# Patient Record
Sex: FEMALE | Race: WHITE | NOT HISPANIC OR LATINO | URBAN - METROPOLITAN AREA
[De-identification: names, ages, dates, MRNs, and addresses within clinical notes are randomized per-mention and may not be internally consistent; named-entity substitution may affect disease eponyms.]

---

## 2023-08-18 ENCOUNTER — TELEPHONE (OUTPATIENT)
Dept: GASTROENTEROLOGY | Facility: CLINIC | Age: 23
End: 2023-08-18
Payer: COMMERCIAL

## 2023-08-18 NOTE — TELEPHONE ENCOUNTER
----- Message from Booker Sanchez sent at 8/18/2023 11:06 AM CDT -----  Name of Who is Calling:TEX TOM [[id number]]        What is the request in detail:Would like a call back to discuss getting a NP appt for IBS, pt only wants to see Np Saale if possible. Please advise       Can the clinic reply by [facility]:NO        What Number to Call Back if not in [facility]:.Telephone Information:  Mobile          [phone]

## 2023-08-25 ENCOUNTER — OFFICE VISIT (OUTPATIENT)
Dept: GASTROENTEROLOGY | Facility: CLINIC | Age: 23
End: 2023-08-25
Payer: COMMERCIAL

## 2023-08-25 VITALS
BODY MASS INDEX: 23.35 KG/M2 | DIASTOLIC BLOOD PRESSURE: 70 MMHG | WEIGHT: 157.63 LBS | HEART RATE: 88 BPM | SYSTOLIC BLOOD PRESSURE: 112 MMHG | HEIGHT: 69 IN

## 2023-08-25 DIAGNOSIS — R11.0 NAUSEA: ICD-10-CM

## 2023-08-25 DIAGNOSIS — K58.1 IRRITABLE BOWEL SYNDROME WITH CONSTIPATION: Primary | ICD-10-CM

## 2023-08-25 PROCEDURE — 1160F PR REVIEW ALL MEDS BY PRESCRIBER/CLIN PHARMACIST DOCUMENTED: ICD-10-PCS | Mod: CPTII,S$GLB,, | Performed by: NURSE PRACTITIONER

## 2023-08-25 PROCEDURE — 1160F RVW MEDS BY RX/DR IN RCRD: CPT | Mod: CPTII,S$GLB,, | Performed by: NURSE PRACTITIONER

## 2023-08-25 PROCEDURE — 3078F PR MOST RECENT DIASTOLIC BLOOD PRESSURE < 80 MM HG: ICD-10-PCS | Mod: CPTII,S$GLB,, | Performed by: NURSE PRACTITIONER

## 2023-08-25 PROCEDURE — 3078F DIAST BP <80 MM HG: CPT | Mod: CPTII,S$GLB,, | Performed by: NURSE PRACTITIONER

## 2023-08-25 PROCEDURE — 3074F SYST BP LT 130 MM HG: CPT | Mod: CPTII,S$GLB,, | Performed by: NURSE PRACTITIONER

## 2023-08-25 PROCEDURE — 3008F BODY MASS INDEX DOCD: CPT | Mod: CPTII,S$GLB,, | Performed by: NURSE PRACTITIONER

## 2023-08-25 PROCEDURE — 99204 OFFICE O/P NEW MOD 45 MIN: CPT | Mod: S$GLB,,, | Performed by: NURSE PRACTITIONER

## 2023-08-25 PROCEDURE — 3074F PR MOST RECENT SYSTOLIC BLOOD PRESSURE < 130 MM HG: ICD-10-PCS | Mod: CPTII,S$GLB,, | Performed by: NURSE PRACTITIONER

## 2023-08-25 PROCEDURE — 99999 PR PBB SHADOW E&M-EST. PATIENT-LVL IV: CPT | Mod: PBBFAC,,, | Performed by: NURSE PRACTITIONER

## 2023-08-25 PROCEDURE — 99204 PR OFFICE/OUTPT VISIT, NEW, LEVL IV, 45-59 MIN: ICD-10-PCS | Mod: S$GLB,,, | Performed by: NURSE PRACTITIONER

## 2023-08-25 PROCEDURE — 3008F PR BODY MASS INDEX (BMI) DOCUMENTED: ICD-10-PCS | Mod: CPTII,S$GLB,, | Performed by: NURSE PRACTITIONER

## 2023-08-25 PROCEDURE — 99999 PR PBB SHADOW E&M-EST. PATIENT-LVL IV: ICD-10-PCS | Mod: PBBFAC,,, | Performed by: NURSE PRACTITIONER

## 2023-08-25 PROCEDURE — 1159F MED LIST DOCD IN RCRD: CPT | Mod: CPTII,S$GLB,, | Performed by: NURSE PRACTITIONER

## 2023-08-25 PROCEDURE — 1159F PR MEDICATION LIST DOCUMENTED IN MEDICAL RECORD: ICD-10-PCS | Mod: CPTII,S$GLB,, | Performed by: NURSE PRACTITIONER

## 2023-08-25 RX ORDER — LUBIPROSTONE 24 UG/1
24 CAPSULE ORAL 2 TIMES DAILY
Qty: 60 CAPSULE | Refills: 11 | Status: SHIPPED | OUTPATIENT
Start: 2023-08-25

## 2023-08-25 NOTE — PROGRESS NOTES
"Clinic Consult:  [facility] Consultation Note    Reason for Consult:  The primary encounter diagnosis was Irritable bowel syndrome with constipation. A diagnosis of Nausea was also pertinent to this visit.    PCP: No, Primary Doctor   No address on file    HPI:  This is a 23 y.o. female here for evaluation of constipation.   Onset: chronic since childhood. Worsened a couple of years ago.   Stool frequency: once a week  Stool consistency: hard  Incomplete evacuation: yes  Associated symptoms: abdominal bloating  and straining. Early satiety. Abdominal pain that is burning and nausea in the AM. 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   Hematochezia or melena present: no  Treatments tried: Linzess 290 mg + Miralax + magnesium-- ineffective or severe diarrhea.      Review of Systems   Constitutional:  Negative for fever and weight loss.   Respiratory:  Negative for cough.    Cardiovascular:  Negative for chest pain and palpitations.   Gastrointestinal:  Positive for abdominal pain, constipation and nausea. Negative for blood in stool, diarrhea, heartburn, melena and vomiting.       Medical History: unremarkable     Surgical History:  has a past surgical history that includes Nasal septoplasty; [place] tooth extraction; and Tonsillectomy and adenoidectomy.    Family History: family history includes Hypertension in her maternal grandmother..     Social History:  reports that she has never smoked. She has never used smokeless tobacco.    Allergies: Reviewed    Home Medications:   No current outpatient medications on file prior to visit.     No current facility-administered medications on file prior to visit.       Physical Exam:  /70 (BP Location: Left arm, Patient Position: Sitting, BP Method: Medium (Manual))   Pulse 88   Ht 5' 9" (1.753 m)   Wt 71.5 kg (157 lb 10.1 oz)   LMP 08/25/2023 (Exact Date)   BMI 23.28 kg/m²   Body mass index is 23.28 kg/m².  Physical Exam  Constitutional:       General: She is " not in acute distress.  HENT:      Head: Normocephalic and atraumatic.   Cardiovascular:      Rate and Rhythm: Normal rate and regular rhythm.      Heart sounds: No murmur heard.  Pulmonary:      Effort: Pulmonary effort is normal. No respiratory distress.      Breath sounds: Normal breath sounds. No wheezing.   Abdominal:      General: Abdomen is flat. Bowel sounds are normal.      Palpations: Abdomen is soft.      Tenderness: There is generalized abdominal tenderness. There is no guarding.   Neurological:      Mental Status: She is alert.   Psychiatric:         Mood and Affect: Mood normal.         Judgment: Judgment normal.     Labs: Pertinent labs reviewed.  CRC Screening: NA    Assessment:  1. Irritable bowel syndrome with constipation    2. Nausea      Recommendations:   - start Amitiza after clean out with miralax prep. See AVS for details.    Irritable bowel syndrome with constipation  -     lubiprostone (AMITIZA) 24 MCG Cap; Take 1 capsule (24 mcg total) by mouth 2 (two) times daily.  Dispense: 60 capsule; Refill: 11    Nausea    Follow up if symptoms worsen or fail to improve.    Thank you so much for allowing me to participate in the care of ELLEN Hubbard

## 2023-09-05 ENCOUNTER — PATIENT MESSAGE (OUTPATIENT)
Dept: GASTROENTEROLOGY | Facility: CLINIC | Age: 23
End: 2023-09-05
Payer: COMMERCIAL

## 2024-10-09 ENCOUNTER — OFFICE VISIT (OUTPATIENT)
Dept: INTERNAL MEDICINE | Facility: CLINIC | Age: 24
End: 2024-10-09
Payer: COMMERCIAL

## 2024-10-09 ENCOUNTER — LAB VISIT (OUTPATIENT)
Dept: LAB | Facility: HOSPITAL | Age: 24
End: 2024-10-09
Payer: COMMERCIAL

## 2024-10-09 ENCOUNTER — HOSPITAL ENCOUNTER (OUTPATIENT)
Dept: CARDIOLOGY | Facility: HOSPITAL | Age: 24
Discharge: HOME | End: 2024-10-09
Attending: PHYSICIAN ASSISTANT
Payer: COMMERCIAL

## 2024-10-09 ENCOUNTER — HOSPITAL ENCOUNTER (OUTPATIENT)
Dept: RADIOLOGY | Facility: HOSPITAL | Age: 24
Discharge: HOME | End: 2024-10-09
Attending: PHYSICIAN ASSISTANT
Payer: COMMERCIAL

## 2024-10-09 VITALS
BODY MASS INDEX: 22.7 KG/M2 | RESPIRATION RATE: 18 BRPM | DIASTOLIC BLOOD PRESSURE: 70 MMHG | HEIGHT: 69 IN | WEIGHT: 153.25 LBS | TEMPERATURE: 98 F | OXYGEN SATURATION: 99 % | SYSTOLIC BLOOD PRESSURE: 104 MMHG

## 2024-10-09 DIAGNOSIS — R07.9 CHEST PAIN, UNSPECIFIED TYPE: ICD-10-CM

## 2024-10-09 DIAGNOSIS — R06.00 DYSPNEA, UNSPECIFIED TYPE: ICD-10-CM

## 2024-10-09 DIAGNOSIS — R06.00 DYSPNEA, UNSPECIFIED TYPE: Primary | ICD-10-CM

## 2024-10-09 DIAGNOSIS — R00.2 HEART PALPITATIONS: ICD-10-CM

## 2024-10-09 DIAGNOSIS — L65.9 HAIR THINNING: ICD-10-CM

## 2024-10-09 DIAGNOSIS — Z00.00 ROUTINE HEALTH MAINTENANCE: Primary | ICD-10-CM

## 2024-10-09 LAB
B-HCG UR QL: NEGATIVE
OHS QRS DURATION: 100 MS
OHS QTC CALCULATION: 420 MS

## 2024-10-09 PROCEDURE — 99204 OFFICE O/P NEW MOD 45 MIN: CPT | Mod: 25,S$GLB,, | Performed by: PHYSICIAN ASSISTANT

## 2024-10-09 PROCEDURE — 71046 X-RAY EXAM CHEST 2 VIEWS: CPT | Mod: 26,,, | Performed by: RADIOLOGY

## 2024-10-09 PROCEDURE — 1159F MED LIST DOCD IN RCRD: CPT | Mod: CPTII,S$GLB,, | Performed by: PHYSICIAN ASSISTANT

## 2024-10-09 PROCEDURE — 93005 ELECTROCARDIOGRAM TRACING: CPT

## 2024-10-09 PROCEDURE — 90471 IMMUNIZATION ADMIN: CPT | Mod: S$GLB,,, | Performed by: PHYSICIAN ASSISTANT

## 2024-10-09 PROCEDURE — 3074F SYST BP LT 130 MM HG: CPT | Mod: CPTII,S$GLB,, | Performed by: PHYSICIAN ASSISTANT

## 2024-10-09 PROCEDURE — 93010 ELECTROCARDIOGRAM REPORT: CPT | Mod: ,,, | Performed by: INTERNAL MEDICINE

## 2024-10-09 PROCEDURE — 71046 X-RAY EXAM CHEST 2 VIEWS: CPT | Mod: TC

## 2024-10-09 PROCEDURE — 3078F DIAST BP <80 MM HG: CPT | Mod: CPTII,S$GLB,, | Performed by: PHYSICIAN ASSISTANT

## 2024-10-09 PROCEDURE — 90656 IIV3 VACC NO PRSV 0.5 ML IM: CPT | Mod: S$GLB,,, | Performed by: PHYSICIAN ASSISTANT

## 2024-10-09 PROCEDURE — 81025 URINE PREGNANCY TEST: CPT | Performed by: PHYSICIAN ASSISTANT

## 2024-10-09 PROCEDURE — 1160F RVW MEDS BY RX/DR IN RCRD: CPT | Mod: CPTII,S$GLB,, | Performed by: PHYSICIAN ASSISTANT

## 2024-10-09 PROCEDURE — 99999 PR PBB SHADOW E&M-EST. PATIENT-LVL IV: CPT | Mod: PBBFAC,,, | Performed by: PHYSICIAN ASSISTANT

## 2024-10-09 PROCEDURE — 3008F BODY MASS INDEX DOCD: CPT | Mod: CPTII,S$GLB,, | Performed by: PHYSICIAN ASSISTANT

## 2024-10-09 RX ORDER — METHYLPREDNISOLONE 4 MG/1
TABLET ORAL
Qty: 21 EACH | Refills: 0 | Status: SHIPPED | OUTPATIENT
Start: 2024-10-09 | End: 2024-10-30

## 2024-10-09 RX ORDER — AMOXICILLIN AND CLAVULANATE POTASSIUM 875; 125 MG/1; MG/1
1 TABLET, FILM COATED ORAL 2 TIMES DAILY
Qty: 20 TABLET | Refills: 0 | Status: SHIPPED | OUTPATIENT
Start: 2024-10-09 | End: 2024-10-19

## 2024-10-09 NOTE — PROGRESS NOTES
"  Subjective:      Patient ID: Joycelyn De Leon is a 24 y.o. female.    Chief Complaint: Establish Care (And lab works)    HPI  History of Present Illness    CHIEF COMPLAINT:  Ms. De Leon presents today for annual exam and shortness of breath/chest pain/tachycardia during exercise.    SHORTNESS OF BREATH:  She reports new onset of shortness of breath during exercise, which began 4-5 months ago. She experiences this dyspnea during both cardio and strength training activities, associated with chest tightness, heaviness, and difficulty taking deep breaths. Her heart rate reaches 170-180 bpm during exercise, which she considers abnormal. Symptoms improve after stopping exercise. She denies shortness of breath when sleeping or lying flat, and denies lightheadedness or dizziness during these episodes.    HAIR CONCERNS:  She reports recent onset of hair breakage, describing it as "little baby hair." She is unsure if this is related to anxiety or thyroid problems.    GASTROINTESTINAL:  She reports chronic constipation, previously suspected to be thyroid-related, though past thyroid panels have been normal. She has made dietary modifications, avoiding red meats and protein due to difficulty digesting, and reports eating plenty of fruits and vegetables. She experiences epigastric pain, which is not constant. A gastroenterologist suggested that the epigastric pain may be related to fullness in the colon. She currently manages her constipation with herbal remedies 2-3 times per week, reporting effectiveness. Previously prescribed Linzess and Amitiza were found ineffective over time and discontinued.    GYNECOLOGICAL HISTORY:  She denies heavy or irregular periods. She is sexually active and not desiring pregnancy at this time. She is not currently on birth control, reporting previous mood issues with hormonal contraception. She expresses interest in learning more about IUD options, particularly the copper IUD.    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 She has a history of severe allergies during childhood but denies asthma diagnosis. No current wheezing is reported.    FAMILY HISTORY:  She denies family history of heart problems, lung problems, blood disorders, or clotting disorders.    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 She denies smoking.    REVIEW OF SYSTEMS:  She reports experiencing fatigue. She denies lightheadedness, dizziness, cough, numbness, or tingling.    Medications were reviewed          There is no problem list on file for this patient.      No current outpatient medications on file.  No current facility-administered medications for this visit.    Review of Systems   Constitutional:  Positive for fatigue. Negative for activity change, appetite change, chills, diaphoresis, fever and unexpected weight change.   HENT: Negative.  Negative for congestion, hearing loss, postnasal drip, rhinorrhea, sore throat, trouble swallowing and voice change.    Eyes: Negative.  Negative for visual disturbance.   Respiratory:  Positive for chest tightness and shortness of breath. Negative for cough and choking.    Cardiovascular:  Positive for palpitations. Negative for chest pain and leg swelling.   Gastrointestinal:  Negative for abdominal distention, abdominal pain, blood in stool, constipation, diarrhea, nausea and vomiting.   Endocrine: Negative for cold intolerance, heat intolerance, polydipsia and polyuria.   Genitourinary: Negative.  Negative for difficulty urinating and frequency.   Musculoskeletal:  Negative for arthralgias, back pain, gait problem, joint swelling and myalgias.   Skin:  Negative for color change, pallor, rash and wound.   Neurological:  Negative for dizziness,  "tremors, weakness, light-headedness, numbness and headaches.   Hematological:  Negative for adenopathy.   Psychiatric/Behavioral:  Negative for agitation, behavioral problems, confusion, decreased concentration, dysphoric mood, hallucinations, self-injury, sleep disturbance and suicidal ideas. The patient is not nervous/anxious and is not hyperactive.      Objective:   /70 (BP Location: Left arm, Patient Position: Sitting)   Temp 98.2 °F (36.8 °C) (Tympanic)   Resp 18   Ht 5' 9" (1.753 m)   Wt 69.5 kg (153 lb 3.5 oz)   SpO2 99%   BMI 22.63 kg/m²     Physical Exam  Vitals reviewed.   Constitutional:       General: She is not in acute distress.     Appearance: Normal appearance. She is well-developed. She is not ill-appearing, toxic-appearing or diaphoretic.   HENT:      Head: Normocephalic and atraumatic.      Right Ear: Tympanic membrane, ear canal and external ear normal.      Left Ear: Tympanic membrane, ear canal and external ear normal.      Nose: Nose normal.      Mouth/Throat:      Mouth: Mucous membranes are moist.      Pharynx: No oropharyngeal exudate or posterior oropharyngeal erythema.   Eyes:      Conjunctiva/sclera: Conjunctivae normal.      Pupils: Pupils are equal, round, and reactive to light.   Cardiovascular:      Rate and Rhythm: Normal rate and regular rhythm.      Heart sounds: Normal heart sounds. No murmur heard.     No friction rub. No gallop.   Pulmonary:      Effort: Pulmonary effort is normal. No respiratory distress.      Breath sounds: Normal breath sounds. No stridor. No wheezing, rhonchi or rales.   Chest:      Chest wall: No tenderness.   Abdominal:      General: There is no distension.      Palpations: Abdomen is soft.      Tenderness: There is no abdominal tenderness.   Musculoskeletal:         General: Normal range of motion.      Cervical back: Normal range of motion and neck supple.   Lymphadenopathy:      Cervical: No cervical adenopathy.   Skin:     General: Skin is " warm and dry.      Capillary Refill: Capillary refill takes less than 2 seconds.      Findings: No rash.   Neurological:      Mental Status: She is alert and oriented to person, place, and time.      Motor: No weakness.      Coordination: Coordination normal.      Gait: Gait normal.   Psychiatric:         Mood and Affect: Mood normal.         Behavior: Behavior normal.         Thought Content: Thought content normal.         Judgment: Judgment normal.         Assessment:     1. Routine health maintenance    2. Dyspnea, unspecified type    3. Chest pain, unspecified type    4. Heart palpitations    5. Hair thinning      Plan:   Routine health maintenance  -     CBC Auto Differential; Future; Expected date: 10/09/2024  -     Comprehensive Metabolic Panel; Future  -     Lipid Panel; Future; Expected date: 10/09/2024  -     influenza (Flulaval, Fluzone, Fluarix) 45 mcg/0.5 mL IM vaccine (> or = 6 mo) 0.5 mL    Dyspnea, unspecified type  -     D-Dimer, Quantitative; Future; Expected date: 10/09/2024  -     EKG 12-lead; Future  -     Troponin I; Future; Expected date: 10/09/2024  -     X-Ray Chest PA And Lateral; Future; Expected date: 10/09/2024  -     Holter monitor - 48 hour; Future    Chest pain, unspecified type  -     D-Dimer, Quantitative; Future; Expected date: 10/09/2024  -     EKG 12-lead; Future  -     Troponin I; Future; Expected date: 10/09/2024  -     X-Ray Chest PA And Lateral; Future; Expected date: 10/09/2024  -     Holter monitor - 48 hour; Future    Heart palpitations  -     TSH; Future  -     Holter monitor - 48 hour; Future    Hair thinning  -     TSH; Future      Considering multiple etiologies for patient's dyspnea and chest tightness during exercise, including cardiac and pulmonary causes  Ordered comprehensive workup to evaluate symptoms, including EKG, chest XR, and Holter monitor  Considered possible relationship between chronic constipation and breathing difficulties due to potential  diaphragmatic pressure from distended colon  Evaluated for potential thyroid dysfunction as a cause of hair breakage and constipation, despite previous normal thyroid panels  Assessed for possible anemia given patient's dietary restrictions and exercise-induced symptoms  Ordered D-dimer to rule out pulmonary embolism, despite low risk factors  Considered referral to cardiology pending initial test results    BIRTH CONTROL:  - Discussed various birth control options, including IUD and oral contraceptives, noting individual variability in side effects and efficacy.    ANXIETY:  - Explained that anxiety as a cause for symptoms would be a diagnosis of exclusion after ruling out other potential causes.    CARDIAC EVALUATION:  - EKG ordered.  - Holter monitor for 2-3 days ordered.    PULMONARY EVALUATION:  - Chest XR ordered.    LABS:  - Routine labs ordered.  - D-dimer ordered.    FLU VACCINATION:  - Flu shot administered in office.    FOLLOW UP:  - Follow up when test results are available.  - Provider will message patient via [employer].  - Follow up to schedule appointments for ordered tests at the .         Follow up if symptoms worsen or fail to improve.

## 2024-10-11 ENCOUNTER — HOSPITAL ENCOUNTER (OUTPATIENT)
Dept: CARDIOLOGY | Facility: HOSPITAL | Age: 24
Discharge: HOME | End: 2024-10-11
Attending: PHYSICIAN ASSISTANT
Payer: COMMERCIAL

## 2024-10-14 ENCOUNTER — PATIENT MESSAGE (OUTPATIENT)
Dept: INTERNAL MEDICINE | Facility: CLINIC | Age: 24
End: 2024-10-14
Payer: COMMERCIAL

## 2024-10-15 ENCOUNTER — PATIENT MESSAGE (OUTPATIENT)
Dept: CARDIOLOGY | Facility: HOSPITAL | Age: 24
End: 2024-10-15
Payer: COMMERCIAL

## 2024-10-21 ENCOUNTER — PATIENT MESSAGE (OUTPATIENT)
Dept: CARDIOLOGY | Facility: HOSPITAL | Age: 24
End: 2024-10-21
Payer: COMMERCIAL